# Patient Record
Sex: MALE | Race: AMERICAN INDIAN OR ALASKA NATIVE
[De-identification: names, ages, dates, MRNs, and addresses within clinical notes are randomized per-mention and may not be internally consistent; named-entity substitution may affect disease eponyms.]

---

## 2022-09-01 ENCOUNTER — HOSPITAL ENCOUNTER (EMERGENCY)
Dept: HOSPITAL 5 - ED | Age: 47
Discharge: HOME | End: 2022-09-01
Payer: SELF-PAY

## 2022-09-01 VITALS — SYSTOLIC BLOOD PRESSURE: 156 MMHG | DIASTOLIC BLOOD PRESSURE: 99 MMHG

## 2022-09-01 DIAGNOSIS — H60.91: Primary | ICD-10-CM

## 2022-09-01 PROCEDURE — 99282 EMERGENCY DEPT VISIT SF MDM: CPT

## 2022-09-01 NOTE — EMERGENCY DEPARTMENT REPORT
ED ENT HPI





- General


Chief complaint: Earache


Stated complaint: EAR HURTS


Time Seen by Provider: 09/01/22 03:58


Source: patient


Mode of arrival: Ambulatory


Limitations: No Limitations





- History of Present Illness


Initial comments: 





46-year-old male presents emerged from complaining of right ear pain associated 

with discharge over the last 2 to 3 days.  Pain emerged after wearing work issue

earplugs for some work related duties.  He noticed some pain and swelling after 

completing his duties which intensified since the onset pain is dull and 

throbbing worse with palpation and range of motion.  No fever, chills, sweats.  

No odynophagia, no dysphagia, no hemoptysis, no hematemesis, no nasal 

congestion, no headache or dizziness


MD complaint: ear pain


-: Gradual, days(s) (3)


Location: R ear


Severity: mild


Quality: aching, dull


Consistency: constant


Improves with: none


Worsens with: none


Context- Dental: history of dental caries


Context- Ear: recent illness


Associated Symptoms: discharge from ear.  denies: gum swelling, toothache, pain 

with swallowing, sore throat, tinnitus, rhinorrhea





- Related Data


                                  Previous Rx's











 Medication  Instructions  Recorded  Last Taken  Type


 


Neomy/Polymyx B/Hc (Otic) Soln 4 drops AD TID #1 bottle 09/01/22 Unknown Rx





[Cortisporin (Otic) Soln]    











                                    Allergies











Allergy/AdvReac Type Severity Reaction Status Date / Time


 


No Known Allergies Allergy   Unverified 09/01/22 02:26














ED Dental HPI





- General


Chief complaint: Earache


Stated complaint: EAR HURTS


Time Seen by Provider: 09/01/22 03:58


Source: patient


Mode of arrival: Ambulatory


Limitations: No Limitations





- Related Data


                                  Previous Rx's











 Medication  Instructions  Recorded  Last Taken  Type


 


Neomy/Polymyx B/Hc (Otic) Soln 4 drops AD TID #1 bottle 09/01/22 Unknown Rx





[Cortisporin (Otic) Soln]    











                                    Allergies











Allergy/AdvReac Type Severity Reaction Status Date / Time


 


No Known Allergies Allergy   Unverified 09/01/22 02:26














ED Review of Systems


ROS: 


Stated complaint: EAR HURTS


Other details as noted in HPI





Comment: All other systems reviewed and negative





ED Past Medical Hx





- Medications


Home Medications: 


                                Home Medications











 Medication  Instructions  Recorded  Confirmed  Last Taken  Type


 


Neomy/Polymyx B/Hc (Otic) Soln 4 drops AD TID #1 bottle 09/01/22  Unknown Rx





[Cortisporin (Otic) Soln]     














ED Physical Exam





- General


Limitations: No Limitations


General appearance: alert, in no apparent distress





- Head


Head exam: Present: atraumatic, normocephalic





- Eye


Eye exam: Present: normal appearance





- ENT


ENT exam: Present: normal exam, normal orophraynx, mucous membranes moist, other

(Tenderness to the right)





- Neck


Neck exam: Present: normal inspection, full ROM





- Respiratory


Respiratory exam: Present: normal lung sounds bilaterally.  Absent: respiratory 

distress, wheezes, rales, stridor, chest wall tenderness





- Cardiovascular


Cardiovascular Exam: Present: regular rate, normal rhythm.  Absent: bradycardia,

tachycardia, systolic murmur, diastolic murmur, rubs, gallop





- GI/Abdominal


GI/Abdominal exam: Present: soft, normal bowel sounds





- Rectal


Rectal exam: Present: deferred





- Extremities Exam


Extremities exam: Present: normal inspection, full ROM, normal capillary refill





- Back Exam


Back exam: Present: normal inspection, full ROM.  Absent: CVA tenderness (R), 

CVA tenderness (L)





- Neurological Exam


Neurological exam: Present: alert, oriented X3, CN II-XII intact





- Psychiatric


Psychiatric exam: Present: normal affect, normal mood





- Skin


Skin exam: Present: warm, dry, intact, normal color.  Absent: rash





ED Course





                                   Vital Signs











  09/01/22





  02:25


 


Temperature 98.6 F


 


Pulse Rate 87


 


Respiratory 16





Rate 


 


Blood Pressure 152/108


 


O2 Sat by Pulse 98





Oximetry 














ED Medical Decision Making





- Medical Decision Making





Exam and history Mr. De Guzman consistent with otitis externa. I have low suspicion

at this time for mastoiditis, malignant otitis externa, herpes, retained foreign

body. No evidence of any tympanic membrane rupture 4th cranial nerve palsy  plan

will provide a prescription for antibiotics for the next 7 to 10 days. And 

return precautions were were discussed with full understanding.


Critical care attestation.: 


If time is entered above; I have spent that time in minutes in the direct care 

of this critically ill patient, excluding procedure time.








ED Disposition


Clinical Impression: 


 Otitis externa





Disposition: 01 HOME / SELF CARE / HOMELESS


Is pt being admited?: No


Does the pt Need Aspirin: No


Condition: Stable


Instructions:  Ear Drops, Adult, Ear Drops, Adult, Easy-to-Read, Otitis Externa


Prescriptions: 


Neomy/Polymyx B/Hc (Otic) Soln [Cortisporin (Otic) Soln] 4 drops AD TID #1 

bottle


Referrals: 


RUSSELL CERVANTES MD [Primary Care Provider] - 3-5 Days